# Patient Record
Sex: MALE | Race: WHITE | NOT HISPANIC OR LATINO | URBAN - METROPOLITAN AREA
[De-identification: names, ages, dates, MRNs, and addresses within clinical notes are randomized per-mention and may not be internally consistent; named-entity substitution may affect disease eponyms.]

---

## 2018-04-06 ENCOUNTER — OFFICE VISIT CONVERTED (OUTPATIENT)
Dept: PULMONOLOGY | Facility: CLINIC | Age: 83
End: 2018-04-06
Attending: INTERNAL MEDICINE

## 2018-05-23 ENCOUNTER — OFFICE VISIT CONVERTED (OUTPATIENT)
Dept: UROLOGY | Facility: CLINIC | Age: 83
End: 2018-05-23
Attending: UROLOGY

## 2018-05-23 ENCOUNTER — CONVERSION ENCOUNTER (OUTPATIENT)
Dept: SURGERY | Facility: CLINIC | Age: 83
End: 2018-05-23

## 2018-05-29 ENCOUNTER — OFFICE VISIT CONVERTED (OUTPATIENT)
Dept: CARDIOLOGY | Facility: CLINIC | Age: 83
End: 2018-05-29
Attending: SPECIALIST

## 2018-05-29 ENCOUNTER — CONVERSION ENCOUNTER (OUTPATIENT)
Dept: CARDIOLOGY | Facility: CLINIC | Age: 83
End: 2018-05-29

## 2018-06-29 ENCOUNTER — OFFICE VISIT CONVERTED (OUTPATIENT)
Dept: CARDIOLOGY | Facility: CLINIC | Age: 83
End: 2018-06-29
Attending: SPECIALIST

## 2018-07-23 ENCOUNTER — OFFICE VISIT CONVERTED (OUTPATIENT)
Dept: UROLOGY | Facility: CLINIC | Age: 83
End: 2018-07-23
Attending: UROLOGY

## 2018-08-07 ENCOUNTER — OFFICE VISIT CONVERTED (OUTPATIENT)
Dept: CARDIOLOGY | Facility: CLINIC | Age: 83
End: 2018-08-07
Attending: SPECIALIST

## 2018-08-22 ENCOUNTER — OFFICE VISIT CONVERTED (OUTPATIENT)
Dept: PULMONOLOGY | Facility: CLINIC | Age: 83
End: 2018-08-22
Attending: PHYSICIAN ASSISTANT

## 2018-09-26 ENCOUNTER — OFFICE VISIT CONVERTED (OUTPATIENT)
Dept: PULMONOLOGY | Facility: CLINIC | Age: 83
End: 2018-09-26
Attending: PHYSICIAN ASSISTANT

## 2018-11-28 ENCOUNTER — OFFICE VISIT CONVERTED (OUTPATIENT)
Dept: PULMONOLOGY | Facility: CLINIC | Age: 83
End: 2018-11-28
Attending: PHYSICIAN ASSISTANT

## 2018-12-11 ENCOUNTER — OFFICE VISIT CONVERTED (OUTPATIENT)
Dept: CARDIOLOGY | Facility: CLINIC | Age: 83
End: 2018-12-11
Attending: SPECIALIST

## 2018-12-11 ENCOUNTER — CONVERSION ENCOUNTER (OUTPATIENT)
Dept: CARDIOLOGY | Facility: CLINIC | Age: 83
End: 2018-12-11

## 2019-01-08 ENCOUNTER — OFFICE VISIT CONVERTED (OUTPATIENT)
Dept: PULMONOLOGY | Facility: CLINIC | Age: 84
End: 2019-01-08
Attending: INTERNAL MEDICINE

## 2019-01-28 ENCOUNTER — OFFICE VISIT CONVERTED (OUTPATIENT)
Dept: UROLOGY | Facility: CLINIC | Age: 84
End: 2019-01-28
Attending: UROLOGY

## 2019-01-28 ENCOUNTER — CONVERSION ENCOUNTER (OUTPATIENT)
Dept: SURGERY | Facility: CLINIC | Age: 84
End: 2019-01-28

## 2019-10-21 ENCOUNTER — HOSPITAL ENCOUNTER (OUTPATIENT)
Dept: OTHER | Facility: HOSPITAL | Age: 84
Discharge: HOME | End: 2019-10-21

## 2019-10-21 LAB
APPEARANCE UR: ABNORMAL
BILIRUB UR QL: NEGATIVE
COLOR UR: YELLOW
CONV BACTERIA: ABNORMAL
CONV COLLECTION SOURCE (UA): ABNORMAL
CONV CRYSTALS: ABNORMAL /[HPF]
CONV HYALINE CASTS IN URINE MICRO: ABNORMAL /[LPF]
CONV UROBILINOGEN IN URINE BY AUTOMATED TEST STRIP: 1 {EHRLICHU}/DL (ref 0.1–1)
GLUCOSE UR QL: NEGATIVE MG/DL
HGB UR QL STRIP: NEGATIVE
KETONES UR QL STRIP: NEGATIVE MG/DL
LEUKOCYTE ESTERASE UR QL STRIP: ABNORMAL
NITRITE UR QL STRIP: POSITIVE
PH UR STRIP.AUTO: 8 [PH] (ref 5–8)
PROT UR QL: NEGATIVE MG/DL
RBC #/AREA URNS HPF: ABNORMAL /[HPF]
SP GR UR: 1.01 (ref 1–1.03)
WBC #/AREA URNS HPF: ABNORMAL /[HPF]

## 2019-10-23 LAB
AMOXICILLIN+CLAV SUSC ISLT: <=2
AMPICILLIN SUSC ISLT: 8
AMPICILLIN+SULBAC SUSC ISLT: 4
BACTERIA UR CULT: ABNORMAL
CEFAZOLIN SUSC ISLT: <=4
CEFEPIME SUSC ISLT: <=1
CEFTAZIDIME SUSC ISLT: <=1
CEFTRIAXONE SUSC ISLT: <=1
CEFUROXIME ORAL SUSC ISLT: 16
CEFUROXIME PARENTER SUSC ISLT: 16
CIPROFLOXACIN SUSC ISLT: <=0.25
ERTAPENEM SUSC ISLT: <=0.5
GENTAMICIN SUSC ISLT: <=1
LEVOFLOXACIN SUSC ISLT: <=0.12
NITROFURANTOIN SUSC ISLT: <=16
TETRACYCLINE SUSC ISLT: <=1
TMP SMX SUSC ISLT: <=20
TOBRAMYCIN SUSC ISLT: <=1

## 2021-05-16 VITALS
DIASTOLIC BLOOD PRESSURE: 54 MMHG | HEART RATE: 72 BPM | SYSTOLIC BLOOD PRESSURE: 130 MMHG | HEIGHT: 73 IN | WEIGHT: 164 LBS | BODY MASS INDEX: 21.74 KG/M2

## 2021-05-16 VITALS
DIASTOLIC BLOOD PRESSURE: 68 MMHG | WEIGHT: 159 LBS | BODY MASS INDEX: 21.54 KG/M2 | HEIGHT: 72 IN | HEART RATE: 78 BPM | SYSTOLIC BLOOD PRESSURE: 130 MMHG

## 2021-05-16 VITALS
DIASTOLIC BLOOD PRESSURE: 70 MMHG | WEIGHT: 158 LBS | SYSTOLIC BLOOD PRESSURE: 134 MMHG | HEIGHT: 73 IN | HEART RATE: 78 BPM | BODY MASS INDEX: 20.94 KG/M2

## 2021-05-16 VITALS
DIASTOLIC BLOOD PRESSURE: 84 MMHG | HEIGHT: 73 IN | HEART RATE: 100 BPM | BODY MASS INDEX: 21.34 KG/M2 | SYSTOLIC BLOOD PRESSURE: 150 MMHG | WEIGHT: 161 LBS

## 2021-05-16 VITALS
SYSTOLIC BLOOD PRESSURE: 144 MMHG | BODY MASS INDEX: 21.2 KG/M2 | HEIGHT: 73 IN | DIASTOLIC BLOOD PRESSURE: 76 MMHG | WEIGHT: 160 LBS

## 2021-05-16 VITALS — WEIGHT: 150 LBS | HEIGHT: 73 IN | BODY MASS INDEX: 19.88 KG/M2 | RESPIRATION RATE: 14 BRPM

## 2021-05-16 VITALS
WEIGHT: 167 LBS | SYSTOLIC BLOOD PRESSURE: 140 MMHG | HEIGHT: 73 IN | BODY MASS INDEX: 22.13 KG/M2 | DIASTOLIC BLOOD PRESSURE: 64 MMHG

## 2021-05-28 VITALS
DIASTOLIC BLOOD PRESSURE: 53 MMHG | SYSTOLIC BLOOD PRESSURE: 149 MMHG | HEART RATE: 72 BPM | DIASTOLIC BLOOD PRESSURE: 63 MMHG | DIASTOLIC BLOOD PRESSURE: 70 MMHG | RESPIRATION RATE: 16 BRPM | OXYGEN SATURATION: 94 % | WEIGHT: 170.37 LBS | HEIGHT: 73 IN | TEMPERATURE: 97.9 F | HEART RATE: 89 BPM | BODY MASS INDEX: 22.27 KG/M2 | WEIGHT: 173 LBS | WEIGHT: 168.06 LBS | HEIGHT: 73 IN | TEMPERATURE: 98.3 F | OXYGEN SATURATION: 94 % | HEIGHT: 73 IN | OXYGEN SATURATION: 94 % | TEMPERATURE: 97.8 F | RESPIRATION RATE: 16 BRPM | BODY MASS INDEX: 22.58 KG/M2 | SYSTOLIC BLOOD PRESSURE: 145 MMHG | SYSTOLIC BLOOD PRESSURE: 100 MMHG | BODY MASS INDEX: 22.93 KG/M2 | RESPIRATION RATE: 16 BRPM | HEART RATE: 78 BPM

## 2021-05-28 VITALS
BODY MASS INDEX: 21.09 KG/M2 | RESPIRATION RATE: 16 BRPM | WEIGHT: 159.12 LBS | HEIGHT: 73 IN | DIASTOLIC BLOOD PRESSURE: 47 MMHG | DIASTOLIC BLOOD PRESSURE: 59 MMHG | HEART RATE: 62 BPM | RESPIRATION RATE: 16 BRPM | SYSTOLIC BLOOD PRESSURE: 106 MMHG | BODY MASS INDEX: 21.64 KG/M2 | HEIGHT: 73 IN | HEART RATE: 76 BPM | TEMPERATURE: 97.9 F | OXYGEN SATURATION: 88 % | SYSTOLIC BLOOD PRESSURE: 109 MMHG | OXYGEN SATURATION: 92 %

## 2021-05-28 NOTE — PROGRESS NOTES
Patient: WILLIAM LACY     Acct: [id number]     Report: #[id number]  UNIT #: [id number]     : 1933    Encounter Date:2018  PRIMARY CARE: PAULY LINDA  ***Signed***  --------------------------------------------------------------------------------------------------------------------  Chief Complaint      Encounter Date      2018            Primary Care Provider      PAULY LINDA            Referring Provider      Nando Payan            Patient Complaint      Patient is complaining of      pia            TRANSITION OF CARE 14D      Transition of Care      PIA 14 Day Followup      William presents to office for follow up post discharge from inpatient status     within 14 calendar days. Patient was contacted within 2 business days via phone     conversation. Documentation of that phone call is present in the patient's     electronic chart. He  was admitted to inpatient facility on 18 and was     discharged on 18 due to:  .            Admitting MD: toribio aiken             His  discharge summary has been reviewed and placed in the patient's electronic     chart.      DISCHARGE DIAGNOSES:        1. Acute exacerbation of chronic end-stage heart failure with preserved           ejection fraction, diastolic, improved.        2. Recurrent pleural effusions.        3. Postprocedure day #3, status post PleurX drain.        4. COPD without exacerbation.        5. Chronic atrial fib with intermittent RVR improved.        6. Chronic hypoxemic respiratory failure on two liters of home oxygen.        7. Coronary artery disease status post coronary artery bypass grafting.        8. Hypertension.        9. Hyperlipidemia.        10 History of stroke.        11 Recurrent upper GI bleeding secondary to AVMs, currently not bleeding.        12 Hypokalemia, resolved.            Problem List      Problem List Reviewed      Patient's problem list has been reviewed from patient discharge summary.             Medications Reviewed      Meds Reviewed      Patient €™s outpatient medication list has been reconciled with the medication     list from the discharge summary and has been reviewed with the patient.            VITALS      Height 6 ft 1 in / 185.42 cm      Weight 173 lbs 0 oz / 78.514119 kg      BSA 2.02 m2      BMI 22.8 kg/m2      Temperature 97.8 F / 36.56 C - Oral      Pulse 89      Respirations 16      Blood Pressure 100/53 Sitting, Left Arm      Pulse Oximetry 94%, 2 liters            HPI      The patient is a very pleasant 85 year old white male who is well known to our     practice for multiple hospital admissions and office visits. She has had     recurrent right sided pleural effusion and during his most recent hospital admis    ana on 10/09/18 he was seen by Dr. Harding and had a Pleurx catheter placed. He    has recurrent pleural effusions secondary to heart failure. He has chronic     diastolic heart failure and frequently becomes volume overloaded. He had a     Pleurx catheter placed with 500 ml of clear yellow fluid drained. He is has home    health come every 2 days and his son reports that some times they drain as much     as 500 ml but yesterday only 100 ml was drained. The patient feels significant     relief after having his Pleurx catheter drained but feels some discomfort at the    site and feels like the sutures are irritating his skin. He overall feels less     short of breath now, denies any increased coughing or wheezing, denies lower     extremity edema or orthopnea. He has been using his Brovana and Pulmicort     nebulizers along with DuoNeb as needed.             I have reviewed his Review of Systems medical, surgical and family history and     agree with those as entered.      Copies To:   Nando Payan      Constitutional:  Denies: Fatigue, Fever, Weight gain, Weight loss, Chills,     Insomnia, Other      Respiratory/Breathing:  Complains of: Shortness of  air, Wheezing, Cough; Denies:    Hemoptysis, Pleuritic pain, Other      Endocrine:  Denies: Polydipsia, Polyuria, Heat/cold intolerance, Diabetes, Other      Eyes:  Denies: Blurred vision, Vision Changes, Other      Ears, nose, mouth, throat:  Denies: Congestion, Dysphagia, Hearing Changes, Nose    Bleeding, Nasal Discharge, Throat pain, Tinnitus, Other      Cardiovascular:  Denies: Chest Pain, Exertional dyspnea, Peripheral Edema,     Palpitations, Syncope, Wake up Gasping for air, Orthopnea, Tachycardia, Other      Gastrointestinal:  Denies: Abdominal pain/cramping, Bloody stools, Constipation,    Diarrhea, Melena, Nausea, Vomiting, Other      Genitourinary:  Denies: Dysuria, Urinary frequency, Incontinence, Hematuria,     Urgency, Other      Musculoskeletal:  Denies: Joint Pain, Joint Stiffness, Joint Swelling, Myalgias,    Other      Hematologic/lymphatic:  DENIES: Lymphadenopathy, Bruising, Bleeding tendencies,     Other      Neurologic:  Denies: Headache, Numbness, Weakness, Seizures, Other      Psychiatric:  Denies: Anxiety, Appropriate Effect, Depression, Other      Sleep:  No: Excessive daytime sleep, Morning Headache?, Snoring, Insomnia?, Stop    breathing at sleep?, Other      Integumentary:  Denies: Rash, Dry skin, Skin Warm to Touch, Other            FAMILY/SOCIAL/MEDICAL HX      Surgical History:  Yes: CABG (6 vessel), Vascular Surgery (endarecttomy harleen     legs), Other Surgeries; No: Abdominal Surgery, Appendectomy, Bladder Surgery,     Bowel Surgery, Cholecystectomy, Head Surgery, Oral Surgery, Orthopedic Surgery      Stroke - Family Hx:  Mother      Heart - Family Hx:  Sister      Diabetes - Family Hx:  Brother      Cancer/Type - Family Hx:  Sister      Is Father Still Living?:  No      Is Mother Still Living?:  No      Social History:  Tobacco Use      Smoking status:  Former smoker (3ppd for 40 years quit 1996)      Anticoagulation Therapy:  No      Antibiotic Prophylaxis:  No      Medical  History:  Yes: Anemia, Cataracts, Chronic Bronchitis/COPD, Congestive     Heart Failu, Depression, Anxiety, High Blood Pressure, Shortness Of Breath,     Stroke; No: Arthritis, Asthma, Blood Disease, Chemotherapy/Cancer, Deafness or     Ringing Ears, Diabetes, Seizures, Heart Attack, Hemorrhoids/Rectal Prob,     Miscellaneous Medical/oth            PREVENTION      Hx Influenza Vaccination:  Yes (2018)      Date Influenza Vaccine Given:  Sep 25, 2018      Influenza Vaccine Declined:  No      Hx Pneumococcal Vaccination:  Yes      Encouraged to follow-up with:  PCP regarding preventative exams.            ALLERGIES/MEDICATIONS      Allergies:        Coded Allergies:             ASPIRIN (Verified  Allergy, Unknown, SWELLING, 11/9/18)           IODINE (Verified  Allergy, Unknown, RASHES, 11/9/18)           LEVOFLOXACIN (Verified  Allergy, Unknown, RASH, 11/9/18)           PENICILLINS (Verified  Allergy, Unknown, SWELLS ALL OVER, 11/9/18)           QUINOLONES (Verified  Allergy, Unknown, 11/9/18)           MORPHINE (Verified  Adverse Reaction, Unknown, CONFUSION, 11/9/18)      Medications    Last Reconciled on 11/28/18 15:15 by LINA CHAVEZ      Potassium Chloride (Potassium Chloride) 10 Meq Capsule.er      10 MEQ PO BID, #60 CAP.ER         Prov: Robert Patino         11/12/18       Furosemide (Furosemide) 40 Mg Tablet      40 MG PO BID@09,17, #60 TAB         Prov: Robert Patino         11/12/18       Diltiazem CD (Diltiazem CD) 120 Mg Cap.er.24h      120 MG PO BID, #60 CAP.ER         Prov: Robert Patino         11/12/18       Metoprolol Tartrate (Metoprolol Tartrate*) 50 Mg Tablet      50 MG PO BID, #60 TAB 0 Refills         Reported         11/9/18       Sennosides (Senna) 8.6 Mg Tab      8.6 MG PO QDAY for constipation, #30 TAB         Reported         10/30/18       Albuterol/Ipratropium (Duoneb) 3 Ml Ampul.neb      3 ML INH RTQ6H WA, #120 NEB 0 Refills         Reported         10/30/18       Magnesium Hydroxide  (Milk of Magnesia*) 400 Mg/5 Ml Susp      30 ML PO QDAY PRN for CONSTIPATION, #16 OZ 0 Refills         Reported         10/30/18       Arformoterol Tartrate (Brovana) 15 Mcg/2 Ml Vial.neb      15 MCG INH RTBID, #60 NEB         Reported         10/30/18       Neb-Budesonide (Budesonide) 0.5 Mg/2 Ml Ampul.neb      0.5 MG INH RTQ12H, #60 NEB         Reported         10/30/18       Polyethylene Glycol (Miralax*) 17 Gm Packet      17 GM PO QDAY, #30 PKT 0 Refills         Reported         10/30/18       Apixaban (Eliquis) 2.5 Mg Tablet      2.5 MG PO BID for 30 Days, #60 TAB         Reported         10/30/18       Guaifenesin (Humibid La*) 600 Mg Tab.er.12h      1200 MG PO BID for 30 Days, #120 TAB.ER         Prov: Brayan Martinez         10/25/18       Fludrocortisone Acetate (Florinef*) 0.1 Mg Tablet      0.1 MG PO QDAY, TAB         Reported         7/6/18       Finasteride (Finasteride*) 5 Mg Tablet      5 MG PO QDAY for 30 Days, #30 TAB         Prov: MONA GARCIA         5/17/18       Midodrine Hcl (Proamatine) 10 Mg Tab      10 MG PO AC for 30 Days, #90 TAB         Prov: MONA GARCIA         5/17/18       Acetaminophen (ACETAMINOPHEN) 325 Mg Tablet      650 MG PO Q4H PRN for MILD PAIN (1-3)/FEVER, #100 TAB         Reported         10/10/16      Current Medications      Current Medications Reviewed 11/28/18            EXAM      GEN-patient appears stated age resting comfortable in no acute distress      Eyes-PERRL,  conjunctiva are normal in appearance extraocular muscles are     intact, no scleral icterus      Nasal-both nares are patent turbinates appear normal no polyps seen no nasal     discharge or ulcerations      Ears-tympanic membranes are normal no erythema no bulging, normal to inspection      Lymphatic-no swollen or enlarged cervical nodes, or axillary node, or femoral     nodes, or supraclavicular nodes      Mouth normal dentition, no erythema no ulcerations oropharynx appears normal no     exudate no  evidence of postnasal drip, MP(default value)      Neck-there are no palpable supraclavicular or cervical adenopathy, thyroid is     normal in appearance no apparent nodules, there is no inspiratory or expiratory     stridor      Respiratory-mildly decreased bilateral breath sounds throughout, slight right     base crackles appreciated,  no wheezing or rhonchi appreciated, right side chest    has Pleurx catheter in place with 6 sutures in place, slight skin erythema on     the posterior side of the sutures and a small areas anteriorly of skin     blistering that appears to be from dressing or tape.         Cardiovascular-the heart rate is normal and regular S1 and S2 present with no     murmur or extra heart sounds, there is no JVD or pedal edema present      GI-the abdomen is normal in appearance, bowel sounds present and normal in all     quadrants no hepatosplenomegaly or masses felt      Extremities-no clubbing is present, pulses present in all extremities, capillary    refill time is normal      Musculoskeletal-Normal strength in upper and lower extremities, inspection shows    no evidence of muscle atrophy      Skin-skin is normal in appearance it is warm and dry, no rashes present, no     evidence of cyanosis, palpation reveals no masses      Neurological-the patient is alert and oriented to time place and person, moves     all 4 extremities, normal gait, normal affect and mood, CN2-12 intact      Psych-normal judgment and insight is good, normal mood and affect, alert and     oriented to person, place, and time, and date      Vitals      Vitals:             Height 6 ft 1 in / 185.42 cm           Weight 173 lbs 0 oz / 78.394237 kg           BSA 2.02 m2           BMI 22.8 kg/m2           Temperature 97.8 F / 36.56 C - Oral           Pulse 89           Respirations 16           Blood Pressure 100/53 Sitting, Left Arm           Pulse Oximetry 94%, 2 liters            REVIEW      Results Reviewed      [facility]  Results Reviewed?:  Yes Prev Lab Results, Yes Prev Radiology Results, Yes     Previous [facility] Records (I personally reviewed the patient's recent pulmonary     consultation, progress notes, procedure notes and discharge summary. )            Assessment      ASSESSMENT:      1. Recurrent right sided pleural effusions secondary to chronic diastolic heart     failure.      2. Chronic diastolic congestive heart failure currently appears grossly     euvolemia.         3. Status post right sided Pleurx catheter placement.        4. History of community acquired pneumonia       5.  Chronic bronchitis.      6. Chronic obstructive pulmonary disease without acute exacerbation      7. Chronic hypoxic respiratory failure on 2 liters permanent nasal cannula     oxygen.        8. Allergic rhinitis.      9. Frailty.            PLAN:       1. At this time, I have discussed with the patient that I will continue home     health visits every 2 days and they can drain up to 1 liter every 48 hours.     Continue keeping a log and to bring the log with him with how much fluid has     been drained.       2. It has been about 2 weeks since his hospital discharge so I removed his     sutures today. There was some slight skin irritation at his posterior set of     suture but this should likely improve since the sutures have been removed.       3. Continue Brovana and Pulmicort twice daily along with DuoNeb as needed.       4. I reviewed the patient's most recent pathology after thoracentesis pleural     fluid negative for malignant cells.       5. Follow up with Dr. Payan in 2 months, sooner if needed.            Patient Education      Patient Education Provided:  COPD, How to use a Nebulizer      Time Spent:  > 50% /[facility]                 Disclaimer: Converted document may not contain table formatting or lab diagrams. Please see [email or web address] for the authenticated document.

## 2021-05-28 NOTE — PROGRESS NOTES
Patient: JANIE LACY     Acct: [id number]     Report: #[id number]  UNIT #: [id number]     : 1933    Encounter Date:2018  PRIMARY CARE: PAULY LINDA  ***Signed***  --------------------------------------------------------------------------------------------------------------------  Chief Complaint      Encounter Date      2018            Primary Care Provider      PAULY LINDA            Referring Provider      aNndo Payan            Patient Complaint      Patient is complaining of      Pt here for 6m f/u            VITALS      Height 6 ft 1 in / 185.42 cm      Weight 159 lbs 2 oz / 72.942490 kg      BSA 1.92 m2      BMI 21.0 kg/m2      Temperature 97.9 F / 36.61 C - Oral      Pulse 62      Respirations 16      Blood Pressure 106/47 Sitting, Right Arm      Pulse Oximetry 88%, Nasal cannula, 2 lpm            HPI      The patient is an 84 year old white male with COPD here for follow up.  The     patient is doing very well at this time. Breathing is at his baseline.  No     increased cough or sputum production.  Report compliance with the use of the     nebulizers and do feel that they are helping with his symptoms and feels that     his breathing overall is at his baseline.            ROS      Constitutional:  Denies: Fatigue, Fever, Weight gain, Weight loss, Chills,     Insomnia, Other      Respiratory/Breathing:  Complains of: Shortness of air, Denies: Wheezing, Cough    , Hemoptysis, Pleuritic pain, Other      Endocrine:  Denies: Polydipsia, Polyuria, Heat/cold intolerance, Diabetes, Other      Eyes:  Denies: Blurred vision, Vision Changes, Other      Ears, nose, mouth, throat:  Denies: Congestion, Dysphagia, Hearing Changes,     Nose Bleeding, Nasal Discharge, Throat pain, Tinnitus, Other      Cardiovascular:  Denies: Chest Pain, Exertional dyspnea, Peripheral Edema,     Palpitations, Syncope, Wake up Gasping for air, Orthopnea, Tachycardia, Other      Gastrointestinal:  Denies:  Abdominal pain/cramping, Bloody stools, Constipation    , Diarrhea, Melena, Nausea, Vomiting, Other      Genitourinary:  Denies: Dysuria, Urinary frequency, Incontinence, Hematuria,     Urgency, Other      Musculoskeletal:  Denies: Joint Pain, Joint Stiffness, Joint Swelling, Myalgias    , Other      Hematologic/lymphatic:  DENIES: Lymphadenopathy, Bruising, Bleeding tendencies,     Other      Neurologic:  Denies: Headache, Numbness, Weakness, Seizures, Other      Psychiatric:  Denies: Anxiety, Appropriate Effect, Depression, Other      Sleep:  No: Excessive daytime sleep, Morning Headache?, Snoring, Insomnia?,     Stop breathing at sleep?, Other      Integumentary:  Denies: Rash, Dry skin, Skin Warm to Touch, Other            FAMILY/SOCIAL/MEDICAL HX      Surgical History:  Yes: CABG (2004, 6 VESSELL CABG), Vascular Surgery (    ENDARTERECTOMY LEGS and neck), Other Surgeries, No: Abdominal Surgery,     Appendectomy, Bladder Surgery, Bowel Surgery, Cholecystectomy, Head Surgery,     Oral Surgery, Orthopedic Surgery      Stroke - Family Hx:  Mother      Heart - Family Hx:  Sister      Diabetes - Family Hx:  Brother      Cancer/Type - Family Hx:  Sister      Is Father Still Living?:  No      Is Mother Still Living?:  No      Social History:  Tobacco Use      Smoking status:  Former smoker (3ppd for 40 years quit 1996)      Anticoagulation Therapy:  No      Antibiotic Prophylaxis:  No      Medical History:  Yes: Anemia, Arthritis (HANDS AND LEGS), Asthma, Blood     Disease (ANEMIA), Cataracts, Chronic Bronchitis/COPD, Congestive Heart Failu,     Depression, Anxiety, Hemorrhoids/Rectal Prob (bleeding ulcers ), High Blood     Pressure, Shortness Of Breath (PNEUMONIA), Stroke, No: Chemotherapy/Cancer,     Deafness or Ringing Ears, Diabetes, Seizures, Heart Attack, Miscellaneous     Medical/oth      Psychiatric History      Anxiety and depression            Hx Influenza Vaccination:  Yes      Date Influenza Vaccine Given:   Sep 1, 2017      Influenza Vaccine Declined:  No      2 or More Falls Past Year?:  No      Fall Past Year with Injury?:  No      Hx Pneumococcal Vaccination:  Yes      Encouraged to follow-up with:  PCP regarding preventative exams.      Chart initiated by      Val Boyce MA            ALLERGIES/MEDICATIONS      Allergies:        Coded Allergies:             ASPIRIN (Verified  Allergy, Unknown, SWELLING, 4/6/18)           IODINE (Verified  Allergy, Unknown, RASHES, 4/6/18)           LEVOFLOXACIN (Verified  Allergy, Unknown, RASH, 4/6/18)           PENICILLINS (Verified  Allergy, Unknown, SWELLS ALL OVER, 4/6/18)           QUINOLONES (Verified  Allergy, Unknown, 4/6/18)           AZITHROMYCIN (Verified  Adverse Reaction, Severe, NAUSEA / ITCHING, 4/6/18)           MORPHINE (Verified  Adverse Reaction, Unknown, CONFUSION, 4/6/18)      Medications    Last Reconciled on 4/6/18 10:41 by NANDO VAUGHAN MD      Arformoterol Tartrate (Brovana) 15 Mcg/2 Ml Vial.neb      15 MCG INH RTBID for 30 Days, #60 NEB         Prov: Nando Vaughan         6/29/17       Neb-Budesonide (Budesonide) 0.5 Mg/2 Ml Ampul.neb      0.5 MG INH BID, #60 NEB 11 Refills         Prov: Nando Vaughan         6/29/17       Tiotropium Bromide (Spiriva Respimat 2.5 mcg/Puff) 4 Gm Mist.inhal      2 PUFFS INH QDAY, #1 MDI 11 Refills         Prov: Nando Vaughan         6/29/17       Ferrous Sulfate (Ferrous Sulfate*) 325 Mg Tablet      325 MG PO TID for 30 Days, #90 TAB 0 Refills         Prov: Lesvia Mcgee         5/10/17       Guaifenesin (Robitussin*) 100 Mg/5 Ml Liquid      10 ML PO Q8H Y for CONGESTION for 7 Days, #210 ML         Prov: Lesvia Mcgee         5/10/17       Benzonatate (Benzonatate) 100 Mg Capsule      100 MG PO TID for 30 Days, #90 CAP         Prov: Lesvia Mcgee         5/10/17       Sotalol HCl (Sotalol HCl) 120 Mg Tablet      120 MG PO BID for 30 Days, #60 TAB         Prov: Lesvia Mcgee         5/10/17       Diltiazem CD (Cardizem CD) 180 Mg  Capcr      180 MG PO QDAY for 30 Days, #30 CAP.ER         Prov: Lesvia Mcgee         5/10/17       Андрей-Fluticasone (Fluticasone 50 mcg) 16 Gm Spray.susp      2 PUFFS NARE EACH QDAY for 30 Days, #1 BOTTLE 0 Refills         Prov: Lesvia Mcgee         5/10/17       Albuterol/Ipratropium (Duoneb) 3 Ml Ampul.neb      3 ML INH QDAY, #1 NEB 0 Refills         Prov: Lesvia Mcgee         5/10/17       Sotalol HCl (Sotalol AF) 120 Mg Tablet      120 MG PO BID for 30 Days, #60 TAB         Prov: Lesvia Mcgee         5/10/17       Neb-Budesonide (Pulmicort) 0.5 Mg/2 Ml Ampul.neb      0.5 MG INH RTBID for 30 Days, #30 NEB         Prov: Lesvia Mcgee         5/10/17       Digoxin (Digoxin*) 0.25 Mg Tablet      0.25 MG PO QDAY for 30 Days, #30 TAB         Prov: Lesvia Mcgee         5/10/17       Citalopram HBr (CeleXA) 20 Mg Tablet      10 MG PO HS for 30 Days, #15 TAB 0 Refills         Prov: Lesvia Mcgee         5/10/17       Atorvastatin (Atorvastatin) 40 Mg Tablet      40 MG PO HS for 30 Days, #30 TAB 0 Refills         Prov: Lesvia Mcgee         5/10/17       Furosemide (Furosemide) 20 Mg Tablet      20 MG PO BID@09,17 for 30 Days, #60 TAB 0 Refills         Prov: Lesvia Mcgee         5/10/17       Cholecalciferol (Vitamin D3*) 2,000 U Tablet      2000 UNITS PO QDAY for 30 Days, #30 TAB 0 Refills         Prov: Lesvia Mcgee         5/10/17       Potassium Chloride (K-Dur*) 20 Meq Tabcr      20 MEQ PO BID for 30 Days, #60 TAB.SR         Prov: Lesvia Mcgee         5/10/17       Multivitamins (Multi-Day Vitamin) 1 Tab Tablet      1 TAB PO QDAY for 30 Days, #30 TAB 0 Refills         Prov: Lesvia Mcgee         5/10/17       Tiotropium Bromide (Spiriva Respimat 1.25 mcg/puff) 4 Gm Mist.inhal      2 PUFFS INH RTQDAY, #1 INH 0 Refills         Reported         5/2/17       Docusate Sodium (DOK) 100 Mg Cap      100 MG PO QDAY Y for CONSTIPATION, #30 CAP         Prov: MONA GARCIA         10/18/16       Acetaminophen* (Acetaminophen*) 325 Mg Tablet      325 MG PO Q4H Y for MILD  PAIN (1-3)/FEVER, #100 TAB         Reported         10/10/16       Diltiazem CD (Cardizem CD) 180 Mg Capcr      180 MG PO QDAY, #30 CAP.ER         Prov: Braulio Leslie         9/2/16      Current Medications      Current Medications Reviewed 4/6/18            EXAM      GEN-patient appears stated age resting comfortable in no acute distress      Eyes-PERRL,  conjunctiva are normal in appearance extraocular muscles are intact    , no scleral icterus      Lymphatic-no swollen or enlarged cervical nodes,or axillary node, or femoral     nodes, or supraclavicular nodes      Mouth edentulous, no erythema no ulcerations oropharynx appears normal no     exudate no evidence of postnasal drip, MP1      Neck-there are no palpable supraclavicular or cervical adenopathy, thyroid is     normal in appearance no apparent nodules, there is no inspiratory or expiratory     stridor      Respiratory-patient exhibits normal work of breathing, speaking in full     sentences without difficulty, the chest is normal in appearance, auscultation     reveals poor air exchange throughout with scattered wheezes and rhonchi and     rales are present chest is normal to percussion      Cardiovascular-the heart rate is normal however the rhythm is very regular S1     and S2 present with no murmur or extra heart sounds, there is no JVD or pedal     edema present      GI-the abdomen is normal in appearance, bowel sounds present and normal in all     quadrants no hepatosplenomegaly or masses felt      Extremities-no clubbing is present, pulses present in all extremities,     capillary refill time is normal      Skin-skin is normal in appearance it is warm and dry, no rashes present, no     evidence of cyanosis,palpation reveals no masses      Neurological-the patient is alert and oriented to time place and person, moves     all 4 extremities, unable to assess gait because the patient's  in a wheel chair    , normal affect and mood, CN2-12 intact       Psyc-normal judgment and insight is good, normal mood and affect, alert and     oriented to person, place, and time, and date      Vitals      Vitals:             Height 6 ft 1 in / 185.42 cm           Weight 159 lbs 2 oz / 72.567035 kg           BSA 1.92 m2           BMI 21.0 kg/m2           Temperature 97.9 F / 36.61 C - Oral           Pulse 62           Respirations 16           Blood Pressure 106/47 Sitting, Right Arm           Pulse Oximetry 88%, Nasal cannula, 2 lpm            REVIEW      Results Reviewed      PCCS Results Reviewed?:  Yes Prev Lab Results, Yes Prev Radiology Results, Yes     Previous [facility] Records            PLAN      Assessment      Notes      Discontinued Medications      * ARFORMOTEROL TARTRATE (Brovana) 15 MCG/2 ML VIAL.NEB: 15 MCG INH Q12HR #60       Instructions: Submit to Medicare B if applicable. Call for Diagnosis if needed.      ASSESSMENT/PLAN:      1. 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 Continue     Pulmicort, DuoNeb, Daliresp and Spiriva Respimat.             2. Chronic bronchitis. Continue Daliresp 500mg QD            3. Chronic hypoxic respiratory failure. Continue oxygen at 2 liters flow            4. Allergic rhinitis. Continue Claritin.             5. I have personally reviewed laboratory data, imaging as well as previous     medical records.            Patient Education      Education resources provided:  Yes      Patient Education Provided:  COPD                 Disclaimer: Converted document may not contain table formatting or lab diagrams. Please see [employer] for the authenticated document.

## 2021-05-28 NOTE — PROGRESS NOTES
Patient: JANIE LACY     Acct: [id number]     Report: #[id number]  UNIT #: [id number]     : 1933    Encounter Date:2018  PRIMARY CARE: PAULY LINDA  ***Signed***  --------------------------------------------------------------------------------------------------------------------  Chief Complaint      Encounter Date      Sep 26, 2018            Primary Care Provider      PAULY LINDA            Referring Provider      Nando Payan            Patient Complaint      Patient is complaining of      6 month f/u copd ,armin from [facility] and pain in right lung            VITALS      Height 6 ft 1 in / 185.42 cm      Weight 170 lbs 6 oz / 77.542615 kg      BSA 1.99 m2      BMI 22.5 kg/m2      Temperature 98.3 F / 36.83 C - Oral      Pulse 78      Respirations 16      Blood Pressure 145/70 Sitting      Pulse Oximetry 94%, 2 liters            HPI      The patient is a very pleasant 85 year old white male who was recently     hospitalized at [facility] after presenting on 18 with     several day history of increased dyspnea and cough productive of green sputum.     He also reports palpitations and elevated heart rate as high as 150's at home     with a history of atrial fibrillation. The patient was found to have a right si    ded pleural effusion in the setting of pneumonia and thoracentesis was performed    by Dr. Payan on 18. He drained 200 ml of serous fluid which was sent for    microbiology and pathology. The patient clinically improved and was discharged     home on oral antibiotics and says he is feeling much better. His pathology     results were not available at the time of his discharge but now his right     pleural fluid has returned and shows typical cells with atypical lymphoid     proliferation composed of abundant small mature lymphocytes. The patient denies     any increased dyspnea, coughing or wheezing since his discharge. He also denies     palpitations,  hemoptysis, fevers or chills, weight loss, orthopnea or increased     lower extremity edema. His only compliant is soreness in his right mid back from    his previous thoracentesis site but denies any bruising or any external issue     that can be seen.             I have reviewed his Review of Systems medical, surgical and family history and     agree with those as entered.            ROS      Constitutional:  Denies: Fatigue, Fever, Weight gain, Weight loss, Chills,     Insomnia, Other      Respiratory/Breathing:  Complains of: Cough; Denies: Shortness of air, Wheezing,    Hemoptysis, Pleuritic pain, Other      Endocrine:  Denies: Polydipsia, Polyuria, Heat/cold intolerance, Diabetes, Other      Eyes:  Denies: Blurred vision, Vision Changes, Other      Ears, nose, mouth, throat:  Denies: Congestion, Dysphagia, Hearing Changes, Nose    Bleeding, Nasal Discharge, Throat pain, Tinnitus, Other      Cardiovascular:  Denies: Chest Pain, Exertional dyspnea, Peripheral Edema,     Palpitations, Syncope, Wake up Gasping for air, Orthopnea, Tachycardia, Other      Gastrointestinal:  Denies: Abdominal pain/cramping, Bloody stools, Constipation,    Diarrhea, Melena, Nausea, Vomiting, Other      Genitourinary:  Denies: Dysuria, Urinary frequency, Incontinence, Hematuria,     Urgency, Other      Musculoskeletal:  Denies: Joint Pain, Joint Stiffness, Joint Swelling, Myalgias,    Other      Hematologic/lymphatic:  DENIES: Lymphadenopathy, Bruising, Bleeding tendencies,     Other      Neurologic:  Denies: Headache, Numbness, Weakness, Seizures, Other      Psychiatric:  Denies: Anxiety, Appropriate Effect, Depression, Other      Sleep:  No: Excessive daytime sleep, Morning Headache?, Snoring, Insomnia?, Stop    breathing at sleep?, Other      Integumentary:  Denies: Rash, Dry skin, Skin Warm to Touch, Other            FAMILY/SOCIAL/MEDICAL HX      Surgical History:  Yes: CABG (6 vessel), Vascular Surgery (endarecttomy harleen     legs),  Other Surgeries; No: Abdominal Surgery, Appendectomy, Bladder Surgery,     Bowel Surgery, Cholecystectomy, Head Surgery, Oral Surgery, Orthopedic Surgery      Stroke - Family Hx:  Mother      Heart - Family Hx:  Sister      Diabetes - Family Hx:  Brother      Cancer/Type - Family Hx:  Sister      Is Father Still Living?:  No      Is Mother Still Living?:  No      Social History:  Tobacco Use      Smoking status:  Former smoker (3ppd for 40 years quit 1996)      Anticoagulation Therapy:  No      Antibiotic Prophylaxis:  No      Medical History:  Yes: Anemia, Cataracts, Chronic Bronchitis/COPD, Congestive     Heart Failu, Depression, Anxiety, High Blood Pressure, Shortness Of Breath,     Stroke; No: Arthritis, Asthma, Blood Disease, Chemotherapy/Cancer, Deafness or     Ringing Ears, Diabetes, Seizures, Heart Attack, Hemorrhoids/Rectal Prob,     Miscellaneous Medical/oth            PREVENTION      Hx Influenza Vaccination:  Yes      Date Influenza Vaccine Given:  Sep 25, 2018      Influenza Vaccine Declined:  No      2 or More Falls Past Year?:  No      Fall Past Year with Injury?:  No      Hx Pneumococcal Vaccination:  Yes      Encouraged to follow-up with:  PCP regarding preventative exams.      Chart initiated by      Mary Perdue ma            ALLERGIES/MEDICATIONS      Allergies:        Coded Allergies:             ASPIRIN (Verified  Allergy, Unknown, SWELLING, 9/26/18)           IODINE (Verified  Allergy, Unknown, RASHES, 9/26/18)           LEVOFLOXACIN (Verified  Allergy, Unknown, RASH, 9/26/18)           PENICILLINS (Verified  Allergy, Unknown, SWELLS ALL OVER, 9/26/18)           QUINOLONES (Verified  Allergy, Unknown, 9/26/18)           MORPHINE (Verified  Adverse Reaction, Unknown, CONFUSION, 9/26/18)      Medications    Last Reconciled on 9/26/18 13:25 by LINA CHAVEZ      Dronedarone (Multaq) 400 Mg Tab      400 MG PO BID MEALS for 60 Days, #120 TAB         Prov: Getachew Danielson         7/10/18        Apixaban (Eliquis) 2.5 Mg Tablet      2.5 MG PO BID for 30 Days, #60 TAB         Prov: Getachew Danielson         7/10/18       Metoprolol Succinate (Toprol XL*) 25 Mg Tab.er.24h      12.5 MG PO QDAY, #15 TAB 0 Refills         Prov: Getachew Danielson         7/10/18       Polyethylene Glycol (Miralax*) 17 Gm Packet      17 GM PO QDAY, #30 PKT 0 Refills         Reported         7/6/18       Fludrocortisone Acetate (Florinef*) 0.1 Mg Tablet      0.1 MG PO QDAY, TAB         Reported         7/6/18       Sennosides/Docusate Sod 8.6/50 MG (Senokot-S) 1 Each Tablet      1 TAB PO BID for 30 Days, #60 TAB         Prov: MONA GARCIA         5/17/18       Finasteride (Finasteride*) 5 Mg Tablet      5 MG PO QDAY for 30 Days, #30 TAB         Prov: MONA GARCIA         5/17/18       Midodrine Hcl (Proamatine) 10 Mg Tab      10 MG PO AC for 30 Days, #90 TAB         Prov: MONA GARCIA         5/17/18       Albuterol/Ipratropium (Duoneb) 3 Ml Ampul.neb      3 ML INH QDAY, #1 NEB 0 Refills         Prov: Lesvia Mcgee         5/10/17       Acetaminophen* (Acetaminophen*) 325 Mg Tablet      325 MG PO Q4H PRN for MILD PAIN (1-3)/FEVER, #100 TAB         Reported         10/10/16      Current Medications      Current Medications Reviewed 9/26/18            EXAM      GEN-patient appears stated age resting comfortable in no acute distress      Eyes-PERRL,  conjunctiva are normal in appearance extraocular muscles are     intact, no scleral icterus      Nasal-both nares are patent turbinates appear normal no polyps seen no nasal     discharge or ulcerations      Ears-tympanic membranes are normal no erythema no bulging, normal to inspection      Lymphatic-no swollen or enlarged cervical nodes, or axillary node, or femoral     nodes, or supraclavicular nodes      Mouth normal dentition, no erythema no ulcerations oropharynx appears normal no     exudate no evidence of postnasal drip, MP(default value)      Neck-there are no palpable supraclavicular  or cervical adenopathy, thyroid is     normal in appearance no apparent nodules, there is no inspiratory or expiratory     stridor      Respiratory-grossly clear to auscultation but mildly diminished throughout, no     wheezing, rhonchi or crackles appreciated, normal work of breathing noted.       Cardiovascular-the heart rate is normal and regular S1 and S2 present with no     murmur or extra heart sounds, there is no JVD or pedal edema present      GI-the abdomen is normal in appearance, bowel sounds present and normal in all     quadrants no hepatosplenomegaly or masses felt      Extremities-no clubbing is present, pulses present in all extremities, capillary    refill time is normal      Musculoskeletal-Normal strength in upper and lower extremities, inspection shows    no evidence of muscle atrophy      Skin-skin is normal in appearance it is warm and dry, no rashes present, no     evidence of cyanosis, palpation reveals no masses      Neurological-the patient is alert and oriented to time place and person, moves     all 4 extremities, normal gait, normal affect and mood, CN2-12 intact      Psych-normal judgment and insight is good, normal mood and affect, alert and     oriented to person, place, and time, and date      Vitals      Vitals:             Height 6 ft 1 in / 185.42 cm           Weight 170 lbs 6 oz / 77.195999 kg           BSA 1.99 m2           BMI 22.5 kg/m2           Temperature 98.3 F / 36.83 C - Oral           Pulse 78           Respirations 16           Blood Pressure 145/70 Sitting           Pulse Oximetry 94%, 2 liters            REVIEW      Results Reviewed      PCCS Results Reviewed?:  Yes Prev Lab Results, Yes Prev Radiology Results, Yes     Previous [facility] Records (I personally reviewed the patient's recent pulmonary     consultations, progress notes and discharge summary.  )      Lab Results      I personally reviewed the patient's recent pathology and microbiology.            Assessment       Pleural effusion, right - J90            Notes      Discontinued Medications      * CEFDINIR 300 MG CAP: 300 MG PO BID 2 Days #4      New Diagnostics      * Chest 2 View, As Soon As Possible         Dx: Pleural effusion, right - J90      ASSESSMENT:      1. Recent community acquired pneumonia with what was felt to be parapneumonic     right sided pleural effusion.       2. Right sided pleural effusion with pathology of pleural fluid concerning for     atypical lymphoid cells.        3.  COPD with recent exacerbation resolving.       4.  Chronic bronchitis.      5. Chronic hypoxic respiratory failure on 2 liters permanent nasal cannula     oxygen continuously.        6. Allergic rhinitis.      7. Frailty.            PLAN:       1. At this time, I will get a repeat chest x-ray now to see if the patient is     reaccumulating his right sided pleural effusion. If he is I will get him set up     for a repeat right thoracentesis with Dr. Payan.  We will send the pleural     fluid for flow cytometry to further evaluation of possible lymphoma.  I have     discussed this with Dr. Payan given the abnormal cytology of the right pleural    fluid on the pathology showing atypical lymphoid cells.       2. Continue Brovana and Pulmicort nebulizers twice daily, continue DuoNeb PRN.     He has finished his most recent course of antibiotics.       3. Follow up with Dr. Payan in 2 months, sooner if needed. We will call the     patient after he has his chest x-ray performed today.            Patient Education      Patient Education Provided:  COPD, How to use an Inhaler, How to use a Nebulizer      Time Spent:  > 50% /[facility]                 Disclaimer: Converted document may not contain table formatting or lab diagrams. Please see [employer] for the authenticated document.

## 2021-05-28 NOTE — PROGRESS NOTES
Patient: JANIE LACY     Acct: [id number]     Report: #[id number]  UNIT #: [id number]     : 1933    Encounter Date:2019  PRIMARY CARE: PAULY LINDA  ***Signed***  --------------------------------------------------------------------------------------------------------------------  Chief Complaint      Encounter Date      2019            Primary Care Provider      PAULY LINDA            Referring Provider      Nando Payan            Patient Complaint      Patient is complaining of      Pt here for f/u, copd            VITALS      Height 6 ft 1 in / 185.42 cm      Pulse 76      Respirations 16      Blood Pressure 109/59 Sitting, Right Arm      Pulse Oximetry 92%, Nasal cannula, 2 lpm            HPI      The patient is a very pleasant 85 year old white male who has a history of COPD     and heart failure, right sided recurrent pleural effusion, here today for follow    up.  The patient had a pleurx catheter placed in November, currently is draining    very little fluid from his pleural cavity at this time, now has been backed off     to once per week drainage.  The patient was hospitalized 2018. At that     time, the patient was found to be in heart failure after developing rapid atrial    fibrillation.  He feels that his breathing is doing well at this time, has no     symptoms of heart failure.  He occasionally will have some coughing paroxysm     which he feels makes his afib worse.  He has had these coughing paroxysm for now    and attributes them to his COPD.            ROS      Constitutional:  Denies: Fatigue, Fever, Weight gain, Weight loss, Chills,     Insomnia, Other      Respiratory/Breathing:  Complains of: Shortness of air, Wheezing, Cough; Denies:    Hemoptysis, Pleuritic pain, Other      Endocrine:  Denies: Polydipsia, Polyuria, Heat/cold intolerance, Diabetes, Other      Eyes:  Denies: Blurred vision, Vision Changes, Other      Ears, nose, mouth, throat:  Denies:  Congestion, Dysphagia, Hearing Changes, Nose    Bleeding, Nasal Discharge, Throat pain, Tinnitus, Other      Cardiovascular:  Denies: Chest Pain, Exertional dyspnea, Peripheral Edema,     Palpitations, Syncope, Wake up Gasping for air, Orthopnea, Tachycardia, Other      Gastrointestinal:  Denies: Abdominal pain/cramping, Bloody stools, Constipation,    Diarrhea, Melena, Nausea, Vomiting, Other      Genitourinary:  Denies: Dysuria, Urinary frequency, Incontinence, Hematuria,     Urgency, Other      Musculoskeletal:  Denies: Joint Pain, Joint Stiffness, Joint Swelling, Myalgias,    Other      Hematologic/lymphatic:  DENIES: Lymphadenopathy, Bruising, Bleeding tendencies,     Other      Neurologic:  Denies: Headache, Numbness, Weakness, Seizures, Other      Psychiatric:  Denies: Anxiety, Appropriate Effect, Depression, Other      Sleep:  No: Excessive daytime sleep, Morning Headache?, Snoring, Insomnia?, Stop    breathing at sleep?, Other      Integumentary:  Denies: Rash, Dry skin, Skin Warm to Touch, Other            FAMILY/SOCIAL/MEDICAL HX      Surgical History:  Yes: CABG (6 vessel), Vascular Surgery (endarecttomy harleen     legs), Other Surgeries; No: Abdominal Surgery, Appendectomy, Bladder Surgery,     Bowel Surgery, Cholecystectomy, Head Surgery, Oral Surgery, Orthopedic Surgery      Stroke - Family Hx:  Mother      Heart - Family Hx:  Sister      Diabetes - Family Hx:  Brother      Cancer/Type - Family Hx:  Sister      Is Father Still Living?:  No      Is Mother Still Living?:  No      Social History:  Tobacco Use      Smoking status:  Former smoker (3ppd for 40 years quit 1996)      Anticoagulation Therapy:  No      Antibiotic Prophylaxis:  No      Medical History:  Yes: Anemia, Cataracts, Chronic Bronchitis/COPD, Congestive     Heart Failu, Depression, Anxiety, High Blood Pressure, Shortness Of Breath,     Stroke; No: Arthritis, Asthma, Blood Disease, Chemotherapy/Cancer, Deafness or     Ringing Ears,  Diabetes, Seizures, Heart Attack, Hemorrhoids/Rectal Prob,     Miscellaneous Medical/oth      Psychiatric History      Anxiety and depression            PREVENTION      Hx Influenza Vaccination:  Yes      Date Influenza Vaccine Given:  Sep 1, 2018      Influenza Vaccine Declined:  No      2 or More Falls Past Year?:  No      Fall Past Year with Injury?:  No      Hx Pneumococcal Vaccination:  Yes      Encouraged to follow-up with:  PCP regarding preventative exams.      Chart initiated by      Val Boyce MA            ALLERGIES/MEDICATIONS      Allergies:        Coded Allergies:             ASPIRIN (Verified  Allergy, Unknown, SWELLING, 1/8/19)           IODINE (Verified  Allergy, Unknown, RASHES, 1/8/19)           LEVOFLOXACIN (Verified  Allergy, Unknown, RASH, 1/8/19)           PENICILLINS (Verified  Allergy, Unknown, SWELLS ALL OVER, 1/8/19)           QUINOLONES (Verified  Allergy, Unknown, 1/8/19)           MORPHINE (Verified  Adverse Reaction, Unknown, CONFUSION, 1/8/19)      Medications    Last Reconciled on 1/8/19 11:00 by NANDO VAUGHAN MD      Benzonatate (Tessalon Perles) 100 Mg Capsule      200 MG PO TID, #90 CAP 4 Refills         Prov: Nando Vaughan         1/8/19       Atorvastatin Calcium (Lipitor*) 40 Mg Tablet      40 MG PO QDAY, #30 TAB 0 Refills         Reported         1/8/19       Potassium Chloride (K-Dur*) 20 Meq Tab.er.prt      20 MEQ PO BID, #60 TAB 0 Refills         Reported         1/8/19       Sucralfate (Carafate) 1 Gm Tab      1 GM PO QIDAC for 30 Days, #120 TAB         Prov: Jason Ortiz         12/31/18       Pantoprazole (Protonix*) 40 Mg Tablet.dr      40 MG PO HS for 30 Days, #30 TAB.SR         Prov: Jason Ortiz         12/31/18       Metoprolol Tartrate (Metoprolol Tartrate*) 100 Mg Tablet      150 MG PO BID for 30 Days, #90 TAB         Prov: Jason Ortiz         12/31/18       Digoxin (Digoxin*) 0.125 Mg Tablet      0.125 MG PO QDAY@12 for 30 Days, #30 TAB         Prov:  Jason Ortiz         12/31/18       Atorvastatin (Atorvastatin) 20 Mg Tablet      20 MG PO HS for 30 Days, #30 TAB         Prov: Jason Ortiz         12/31/18       Midodrine Hcl (Proamatine) 10 Mg Tab      10 MG PO TIDAC, TAB         Reported         12/16/18       NEB-Levalbuterol (LEVALBUTEROL HCL) 0.63 Mg/3 Ml Vial.neb      0.63 MG INH Q2H PRN for SHORTNESS OF BREATH, NEB         Reported         12/16/18       Fludrocortisone Acetate (Fludrocortisone Acetate*) 0.1 Mg Tablet      0.1 MG PO QDAY, TAB         Reported         12/16/18       Furosemide (Furosemide) 40 Mg Tablet      40 MG PO BID@09,17, #60 TAB         Prov: Robert Patino         11/12/18       Sennosides (Senna) 8.6 Mg Tab      8.6 MG PO QDAY for constipation, #30 TAB         Reported         10/30/18       Albuterol/Ipratropium (Duoneb) 3 Ml Ampul.neb      3 ML INH RTQ6H WA, #120 NEB 0 Refills         Reported         10/30/18       Arformoterol Tartrate (Brovana) 15 Mcg/2 Ml Vial.neb      15 MCG INH RTBID, #60 NEB         Reported         10/30/18       Neb-Budesonide (Budesonide) 0.5 Mg/2 Ml Ampul.neb      0.5 MG INH RTQ12H, #60 NEB         Reported         10/30/18       Apixaban (Eliquis) 2.5 Mg Tablet      2.5 MG PO BID for 30 Days, #60 TAB         Reported         10/30/18       Finasteride (Finasteride*) 5 Mg Tablet      5 MG PO QDAY for 30 Days, #30 TAB         Prov: MONA GARCIA         5/17/18      Current Medications      Current Medications Reviewed 1/8/19            EXAM      GEN-patient appears stated age resting comfortable in no acute distress      Eyes-PERRL,  conjunctiva are normal in appearance extraocular muscles are     intact, no scleral icterus      Lymphatic-no swollen or enlarged cervical nodes,or axillary node, or femoral     nodes, or supraclavicular nodes      Mouth edentulous, no erythema no ulcerations oropharynx appears normal no exu    date no evidence of postnasal drip, MP1      Neck-there are no palpable  supraclavicular or cervical adenopathy, thyroid is     normal in appearance no apparent nodules, there is no inspiratory or expiratory     stridor      Respiratory-patient exhibits normal work of breathing, speaking in full     sentences without difficulty, the chest is normal in appearance, auscultation     reveals poor air exchange throughout with scattered wheezes and rhonchi and     rales are present chest is normal to percussion      Cardiovascular-the heart rate is normal however the rhythm is very regular S1     and S2 present with no murmur or extra heart sounds, there is no JVD or pedal     edema present      GI-the abdomen is normal in appearance, bowel sounds present and normal in all     quadrants no hepatosplenomegaly or masses felt      Extremities-no clubbing is present, pulses present in all extremities, capillary    refill time is normal      Skin-skin is normal in appearance it is warm and dry, no rashes present, no     evidence of cyanosis,palpation reveals no masses      Neurological-the patient is alert and oriented to time place and person, moves     all 4 extremities, unable to assess gait because the patient's  in a wheel     chair, normal affect and mood, CN2-12 intact      Psyc-normal judgment and insight is good, normal mood and affect, alert and o    riented to person, place, and time, and date      Vitals      Vitals:             Height 6 ft 1 in / 185.42 cm           Pulse 76           Respirations 16           Blood Pressure 109/59 Sitting, Right Arm           Pulse Oximetry 92%, Nasal cannula, 2 lpm            REVIEW      Results Reviewed      PCCS Results Reviewed?:  Yes Prev Lab Results, Yes Prev Radiology Results, Yes     Previous [facility] Records            Assessment      Notes      New Medications      * Potassium Chloride (K-Dur*) 20 MEQ TAB.ER.PRT: 20 MEQ PO BID #60      * Atorvastatin Calcium (Lipitor*) 40 MG TABLET: 40 MG PO QDAY #30      * Benzonatate (Tessalon Perles) 100 MG  CAPSULE: 200 MG PO TID #90      Discontinued Medications      * Potassium Chloride 10 MEQ CAPSULE.ER: 10 MEQ PO BID #60      * Ferrous Sulfate (Ferrous Sulfate*) 325 MG TABLET: 325 MG PO QDAY 30 Days #30      * NITROGLYCERIN (Nitrostat*) 0.4 MG TABLET: 0.4 MG SL Q5MIN PRN CHEST PAIN #20      * Fluconazole (Diflucan) 100 MG TABLET: 100 MG PO QDAY #6      ASSESSMENT/PLAN:       1. Recurrent right sided pleural effusion.  Continue pleurx catheter.   We will     assess patient for removal of pleurx catheter in the next three months if he     does not have any significant output.      2.  Chronic cough.  Likely related to his COPD with chronic bronchitis.  We will    start patient on Tessalon to see if this helps stop some of these coughing     paroxysms that he is currently having.      3.  COPD with chronic bronchitis without exacerbation.  Continue Brovana,     continue Perforomist and continue Spiriva.      4. Chronic hypoxic respiratory failure.  Continue O2 at current flow rate.      5. We will see patient back in three months at which time we will discuss with     him explantation of his pleurx catheter if he is not draining any significant     fluid.      6.  I have personally reviewed laboratory data, imaging as well as previous     medical records.            Patient Education      Education resources provided:  Yes      Patient Education Provided:  COPD                 Disclaimer: Converted document may not contain table formatting or lab diagrams. Please see [employer] [facility] for the authenticated document.

## 2021-05-28 NOTE — PROGRESS NOTES
Patient: JANIE LACY     Acct: [id number]     Report: #[id number]  UNIT #: [id number]     : 1933    Encounter Date:2018  PRIMARY CARE: PAULY LINDA  ***Signed***  --------------------------------------------------------------------------------------------------------------------  Chief Complaint      Encounter Date      Aug 22, 2018            Primary Care Provider      PAULY LINDA            Referring Provider      Nando Payan            Patient Complaint      Patient is complaining of      02 Recert/ Needs 6 Min Walk            VITALS      Height 6 ft 1.00 in / 185.42 cm      Weight 168 lbs 1.000 oz / 76.807293 kg      BSA 1.98 m2      BMI 22.2 kg/m2      Temperature 97.9 F / 36.61 C - Oral      Pulse 72      Respirations 16      Blood Pressure 149/63 Sitting, Left Arm      Pulse Oximetry 94%, 2 liters      Comment: ambulation 86% room air      ambulation 96% on 2liters            HPI      The patient is a pleasant 85-year-old white male with a history of COPD and     chronic hypoxic respiratory failure. He is here for oxygen recertification     today. He tells me that he has overall been about stable. He has chronic dyspnea    but it does not appear to be worse than his baseline. He denies increased co    ughing or wheezing, but does have some baseline coughing and wheezing. Denies     any purulent sputum production. Denies hemoptysis, fever, or chills. He is using    Brovana and Pulmicort nebs b.i.d. and feels that they are helping him.             I have reviewed his review of systems, medical, surgical, and family history and    agree with those as entered.            ROS      Constitutional:  Denies: Fatigue, Fever, Weight gain, Weight loss, Chills,     Insomnia, Other      Respiratory/Breathing:  Complains of: Shortness of air, Wheezing, Cough; Denies:    Hemoptysis, Pleuritic pain, Other      Endocrine:  Denies: Polydipsia, Polyuria, Heat/cold intolerance, Diabetes, Other       Eyes:  Denies: Blurred vision, Vision Changes, Other      Ears, nose, mouth, throat:  Denies: Congestion, Dysphagia, Hearing Changes, Nose    Bleeding, Nasal Discharge, Throat pain, Tinnitus, Other      Cardiovascular:  Denies: Chest Pain, Exertional dyspnea, Peripheral Edema,     Palpitations, Syncope, Wake up Gasping for air, Orthopnea, Tachycardia, Other      Gastrointestinal:  Denies: Abdominal pain/cramping, Bloody stools, Constipation,    Diarrhea, Melena, Nausea, Vomiting, Other      Genitourinary:  Denies: Dysuria, Urinary frequency, Incontinence, Hematuria,     Urgency, Other      Musculoskeletal:  Denies: Joint Pain, Joint Stiffness, Joint Swelling, Myalgias,    Other      Hematologic/lymphatic:  DENIES: Lymphadenopathy, Bruising, Bleeding tendencies,     Other      Neurologic:  Denies: Headache, Numbness, Weakness, Seizures, Other      Psychiatric:  Denies: Anxiety, Appropriate Effect, Depression, Other      Sleep:  No: Excessive daytime sleep, Morning Headache?, Snoring, Insomnia?, Stop    breathing at sleep?, Other      Integumentary:  Denies: Rash, Dry skin, Skin Warm to Touch, Other            FAMILY/SOCIAL/MEDICAL HX      Surgical History:  Yes: CABG (2004, 6 VESSELL CABG), Oral Surgery     (TONSILLECTOMY, ADENOIDECTOMY), Vascular Surgery (ENDARTERECTOMY BILATERAL LEGS     and  LEFT neck), Other Surgeries; No: Abdominal Surgery, Appendectomy, Bladder     Surgery, Bowel Surgery, Cholecystectomy, Head Surgery, Orthopedic Surgery      Stroke - Family Hx:  Mother      Heart - Family Hx:  Sister      Diabetes - Family Hx:  Brother      Cancer/Type - Family Hx:  Sister      Is Father Still Living?:  No      Is Mother Still Living?:  No      Social History:  Tobacco Use      Smoking status:  Former smoker (3ppd for 40 years quit 1996)      Anticoagulation Therapy:  No      Antibiotic Prophylaxis:  No      Medical History:  Yes: Anemia, Arthritis (HANDS AND LEGS), Asthma, Blood Disease    (ANEMIA),  Cataracts, Chronic Bronchitis/COPD, Congestive Heart Failu,     Depression, Anxiety, Hemorrhoids/Rectal Prob (bleeding ulcers ), High Blood     Pressure, Shortness Of Breath (DYSPNEA, PNEUMONIA, BRONCHITIS), Stroke; No: Analia    motherapy/Cancer, Deafness or Ringing Ears, Diabetes, Seizures, Heart Attack,     Miscellaneous Medical/oth            PREVENTION      Hx Influenza Vaccination:  Yes      Date Influenza Vaccine Given:  Sep 1, 2017      Influenza Vaccine Declined:  No      2 or More Falls Past Year?:  No      Fall Past Year with Injury?:  No      Hx Pneumococcal Vaccination:  Yes      Encouraged to follow-up with:  PCP regarding preventative exams.      Chart initiated by      saud Hare            ALLERGIES/MEDICATIONS      Allergies:        Coded Allergies:             ASPIRIN (Verified  Allergy, Unknown, SWELLING, 8/22/18)           IODINE (Verified  Allergy, Unknown, RASHES, 8/22/18)           LEVOFLOXACIN (Verified  Allergy, Unknown, RASH, 8/22/18)           PENICILLINS (Verified  Allergy, Unknown, SWELLS ALL OVER, 8/22/18)           QUINOLONES (Verified  Allergy, Unknown, 8/22/18)           AZITHROMYCIN (Verified  Adverse Reaction, Severe, NAUSEA / ITCHING,     8/22/18)           MORPHINE (Verified  Adverse Reaction, Unknown, CONFUSION, 8/22/18)      Medications    Last Reconciled on 8/22/18 14:32 by LINA CHAVEZ      Dronedarone (Multaq) 400 Mg Tab      400 MG PO BID MEALS for 60 Days, #120 TAB         Prov: Getachew Danielson         7/10/18       Apixaban (Eliquis) 2.5 Mg Tablet      2.5 MG PO BID for 30 Days, #60 TAB         Prov: Getachew Danielson         7/10/18       Metoprolol Succinate (Toprol XL*) 25 Mg Tab.er.24h      12.5 MG PO QDAY, #15 TAB 0 Refills         Prov: Getachew Danielson         7/10/18       Polyethylene Glycol (Miralax*) 17 Gm Packet      17 GM PO QDAY, #30 PKT 0 Refills         Reported         7/6/18       Fludrocortisone Acetate (Florinef*) 0.1 Mg Tablet      0.1  MG PO QDAY, TAB         Reported         7/6/18       Sennosides/Docusate Sod 8.6/50 MG (Senokot-S) 1 Each Tablet      1 TAB PO BID for 30 Days, #60 TAB         Prov: MONA GARCIA         5/17/18       Finasteride (Finasteride*) 5 Mg Tablet      5 MG PO QDAY for 30 Days, #30 TAB         Prov: MONA GARCIA         5/17/18       Midodrine Hcl (Proamatine) 10 Mg Tab      10 MG PO AC for 30 Days, #90 TAB         Prov: MONA GARCIA         5/17/18       Albuterol/Ipratropium (Duoneb) 3 Ml Ampul.neb      3 ML INH QDAY, #1 NEB 0 Refills         Prov: Richard Mcgeerandy         5/10/17       Acetaminophen* (Acetaminophen*) 325 Mg Tablet      325 MG PO Q4H PRN for MILD PAIN (1-3)/FEVER, #100 TAB         Reported         10/10/16      Current Medications      Current Medications Reviewed 8/22/18            EXAM      GEN-patient appears stated age resting comfortable in no acute distress      Eyes-PERRL,   conjunctiva are normal in appearance extraocular muscles are     intact, no scleral icterus      Nasal-both nares are patent turbinates appear normal no polyps seen no nasal     discharge or ulcerations      Ears-tympanic membranes are normal no erythema no bulging, normal to inspection      Lymphatic-no swollen or enlarged cervical nodes, or axillary node, or femoral     nodes, or supraclavicular nodes      Mouth normal dentition, no erythema no ulcerations oropharynx appears normal no     exudate no evidence of postnasal drip      Neck-there are no palpable supraclavicular or cervical adenopathy, thyroid is     normal in appearance no apparent nodules, there is no inspiratory or expiratory     stridor      Respiratory- lungs have mildly decreased breath sounds throughout, no wheezes,     rhonchi, or crackles appreciated, normal work of breathing noted       Cardiovascular-Irregular rate and rhythm, no murmur or gallop appreciated, no     significant lower extremity edema appreciated      GI-the abdomen is normal in appearance, bowel  sounds present and normal in all     quadrants no hepatosplenomegaly or masses felt      Extremities-no clubbing is present, pulses present in all extremities, capillary    refill time is normal      Musculoskeletal-Normal strength in upper and lower extremities, inspection shows    no evidence of muscle atrophy      Skin-skin is normal in appearance it is warm and dry, no rashes present, no     evidence of cyanosis, palpation reveals no masses      Neurological-the patient is alert and oriented to time place and person, moves     all 4 extremities, normal gait, normal affect and mood, CN2-12 intact      Psych-normal judgment and insight is good, normal mood and affect, alert and     oriented to person, place, and time, and date      Vitals      Vitals:             Height 6 ft 1.00 in / 185.42 cm           Weight 168 lbs 1.000 oz / 76.548953 kg           BSA 1.98 m2           BMI 22.2 kg/m2           Temperature 97.9 F / 36.61 C - Oral           Pulse 72           Respirations 16           Blood Pressure 149/63 Sitting, Left Arm           Pulse Oximetry 94%, 2 liters           Comment: ambulation 86% room air      ambulation 96% on 2liters            REVIEW      Results Reviewed      PCCS Results Reviewed?:  Yes Prev Lab Results, Yes Prev Radiology Results, Yes     Previous [facility] Records            Assessment      COPD with acute exacerbation - J44.1            Chronic hypoxemic respiratory failure - J96.11            SOB (shortness of breath) - R06.02            Frailty - R54            Notes      New Diagnostics      * 6 Min Walk With Pulse Ox, As Soon As Possible         Dx: COPD with acute exacerbation - J44.1      ASSESSMENT:      1. COPD with emphysema.      2. Chronic bronchitis.      3. Chronic hypoxic respiratory failure on 2 liters permanent nasal cannula     oxygen.      4. Allergic rhinitis.      5. Frailty.            PLAN:       1. At this time, I have had the patient perform a 6-minute walk test in  the     office today. His resting room air SpO2 is 94%. His ambulation room air SpO2     dropped down to 86%. His ambulation SpO2 on 2 liters per minute nasal cannula     came up to 96%. Therefore, he continues to qualify for and benefit from     supplemental oxygen and we will continue him on that and 2 liters per minute     nasal cannula.      2. I will have him continue on Brovana and Pulmicort nebs b.i.d. as well as     DuoNebs as needed and Daliresp.      3. I will have him followup in 3-4 months with Dr. Payan or sooner if needed.            Patient Education      Patient Education Provided:  COPD, How to use a Nebulizer      Time Spent:  > 50% /[facility]            Patient Education:        COPD                 Disclaimer: Converted document may not contain table formatting or lab diagrams. Please see [employer] [facility] for the authenticated document.